# Patient Record
Sex: MALE | Race: WHITE | NOT HISPANIC OR LATINO | Employment: FULL TIME | URBAN - METROPOLITAN AREA
[De-identification: names, ages, dates, MRNs, and addresses within clinical notes are randomized per-mention and may not be internally consistent; named-entity substitution may affect disease eponyms.]

---

## 2017-12-13 ENCOUNTER — HOSPITAL ENCOUNTER (EMERGENCY)
Facility: HOSPITAL | Age: 52
Discharge: HOME | End: 2017-12-13
Attending: EMERGENCY MEDICINE
Payer: COMMERCIAL

## 2017-12-13 VITALS
WEIGHT: 155 LBS | HEIGHT: 66 IN | OXYGEN SATURATION: 98 % | HEART RATE: 108 BPM | DIASTOLIC BLOOD PRESSURE: 92 MMHG | BODY MASS INDEX: 24.91 KG/M2 | TEMPERATURE: 99 F | SYSTOLIC BLOOD PRESSURE: 178 MMHG | RESPIRATION RATE: 16 BRPM

## 2017-12-13 DIAGNOSIS — S61.209A AVULSION OF FINGER, INITIAL ENCOUNTER: Primary | ICD-10-CM

## 2017-12-13 PROCEDURE — 25000003 PHARM REV CODE 250

## 2017-12-13 PROCEDURE — 99283 EMERGENCY DEPT VISIT LOW MDM: CPT

## 2017-12-13 PROCEDURE — 99284 EMERGENCY DEPT VISIT MOD MDM: CPT | Mod: ,,, | Performed by: EMERGENCY MEDICINE

## 2017-12-13 RX ORDER — SULFAMETHOXAZOLE AND TRIMETHOPRIM 800; 160 MG/1; MG/1
1 TABLET ORAL 2 TIMES DAILY
Status: DISCONTINUED | OUTPATIENT
Start: 2017-12-13 | End: 2017-12-13

## 2017-12-13 RX ORDER — DOXYCYCLINE 100 MG/1
100 CAPSULE ORAL 2 TIMES DAILY
Qty: 20 CAPSULE | Refills: 0 | Status: SHIPPED | OUTPATIENT
Start: 2017-12-13 | End: 2017-12-13

## 2017-12-13 RX ORDER — SULFAMETHOXAZOLE AND TRIMETHOPRIM 800; 160 MG/1; MG/1
1 TABLET ORAL 2 TIMES DAILY
Status: DISCONTINUED | OUTPATIENT
Start: 2017-12-13 | End: 2017-12-13 | Stop reason: HOSPADM

## 2017-12-13 RX ORDER — SULFAMETHOXAZOLE AND TRIMETHOPRIM 800; 160 MG/1; MG/1
1 TABLET ORAL 2 TIMES DAILY
Qty: 14 TABLET | Refills: 0 | Status: SHIPPED | OUTPATIENT
Start: 2017-12-13 | End: 2017-12-13

## 2017-12-13 RX ORDER — DOXYCYCLINE 100 MG/1
100 CAPSULE ORAL 2 TIMES DAILY
Qty: 14 CAPSULE | Refills: 0 | Status: SHIPPED | OUTPATIENT
Start: 2017-12-13 | End: 2017-12-20

## 2017-12-13 RX ORDER — BACITRACIN ZINC 500 UNIT/G
1 OINTMENT (GRAM) TOPICAL 2 TIMES DAILY
Status: DISCONTINUED | OUTPATIENT
Start: 2017-12-13 | End: 2017-12-13 | Stop reason: SDUPTHER

## 2017-12-13 RX ORDER — BACITRACIN ZINC 500 UNIT/G
OINTMENT IN PACKET (EA) TOPICAL
Status: COMPLETED | OUTPATIENT
Start: 2017-12-13 | End: 2017-12-13

## 2017-12-13 RX ORDER — LIDOCAINE HYDROCHLORIDE 10 MG/ML
10 INJECTION INFILTRATION; PERINEURAL
Status: COMPLETED | OUTPATIENT
Start: 2017-12-13 | End: 2017-12-13

## 2017-12-13 RX ORDER — SULFAMETHOXAZOLE AND TRIMETHOPRIM 800; 160 MG/1; MG/1
1 TABLET ORAL 2 TIMES DAILY
Qty: 14 TABLET | Refills: 0 | Status: SHIPPED | OUTPATIENT
Start: 2017-12-13 | End: 2017-12-20

## 2017-12-13 RX ADMIN — BACITRACIN ZINC: 500 OINTMENT TOPICAL at 05:12

## 2017-12-13 RX ADMIN — LIDOCAINE HYDROCHLORIDE 10 ML: 10 INJECTION, SOLUTION INFILTRATION; PERINEURAL at 03:12

## 2017-12-13 RX ADMIN — SULFAMETHOXAZOLE AND TRIMETHOPRIM 1 TABLET: 800; 160 TABLET ORAL at 02:12

## 2017-12-13 NOTE — ED PROVIDER NOTES
Encounter Date: 12/13/2017       History     Chief Complaint   Patient presents with    Laceration     51yo male with no medical history presents to ED with avulsion to right middle finger. Patient caught finger in safety belt at work. Patient complains of throbbing pain. Seen at [facility] where tetanus was updated and advised to report to ED for further evaluation. Bleeding not resolved. Patient denies fever, chills, nausea, vomiting, chest pain, shortness of breath, weakness, syncope, headache.           Review of patient's allergies indicates:  No Known Allergies  History reviewed. No pertinent past medical history.  No past surgical history on file.  No family history on file.  Social History   Substance Use Topics    Smoking status: Not on file    Smokeless tobacco: Not on file    Alcohol use Not on file     Review of Systems   Constitutional: Negative for diaphoresis and fever.   HENT: Negative for nosebleeds.    Eyes: Negative for visual disturbance.   Respiratory: Negative for cough and shortness of breath.    Cardiovascular: Negative for chest pain and leg swelling.   Gastrointestinal: Negative for abdominal distention, nausea and vomiting.   Genitourinary: Negative for dysuria and hematuria.   Musculoskeletal: Negative for neck pain.   Skin: Positive for wound. Negative for rash.   Neurological: Negative for syncope, weakness, light-headedness and headaches.   Psychiatric/Behavioral: The patient is not nervous/anxious.        Physical Exam     Initial Vitals [12/13/17 1139]   BP Pulse Resp Temp SpO2   (!) 178/92 108 16 98.9 °F (37.2 °C) 98 %      MAP       120.67         Physical Exam    Vitals reviewed.  Constitutional: He appears well-developed and well-nourished. He is not diaphoretic. No distress.   HENT:   Head: Normocephalic and atraumatic.   Nose: Nose normal.   Mouth/Throat: Oropharynx is clear and moist. No oropharyngeal exudate.   Eyes: Conjunctivae and EOM are normal. Pupils are equal,  round, and reactive to light.   Neck: Normal range of motion. Neck supple. No thyromegaly present.   Cardiovascular: Normal rate, regular rhythm and intact distal pulses.   Pulses:       Radial pulses are 2+ on the right side, and 2+ on the left side.   Pulmonary/Chest: Breath sounds normal. No respiratory distress. He has no wheezes. He exhibits no tenderness.   Abdominal: Soft. Bowel sounds are normal. He exhibits no distension. There is no tenderness. There is no rebound.   Musculoskeletal: Normal range of motion.   Lymphadenopathy:     He has no cervical adenopathy.   Neurological: He is alert and oriented to person, place, and time. He has normal strength. No cranial nerve deficit or sensory deficit.   Patient neurovascularly intact. All three levels of tendons intact.   Skin: Skin is warm and dry. Capillary refill takes less than 2 seconds. No rash noted.   Avulsion noted to right middle finger. Nail bed intact, strength and sensation intact. , no tendon involvement noted.    Psychiatric: He has a normal mood and affect.         ED Course   Procedures  Labs Reviewed - No data to display     Imaging Results          X-Ray Finger 2 or More Views Right (Final result)  Result time 12/13/17 15:13:09    Final result by Carloz Otoole MD (12/13/17 15:13:09)                 Impression:      1.  No convincing acute displaced fracture or dislocation of the third digit noting diffuse edema and distal soft tissue injury, nail bed injury not excluded.      Electronically signed by: CARLOZ OTOOLE MD  Date:     12/13/17  Time:    15:13              Narrative:    Finger 2 or more views right    Clinical history: Right middle finger avulsion    Comparison: None    Findings:  3 views.    Extensive bandaging overlies the third digit, limiting evaluation of the soft tissues and osseous structures.  Allowing for this, no convincing acute displaced fracture or dislocation of the third digit.  No convincing radiopaque foreign  body.  There is a soft tissue defect involving the distal aspect of the digit, in this patient with provided history of avulsion.  Nail bed injury is not excluded.  There is diffuse edema about the digit.                                 Medical Decision Making:   History:   Old Medical Records: I decided to obtain old medical records.  Old Records Summarized: records from clinic visits.  Clinical Tests:   Radiological Study: Ordered and Reviewed       APC / Resident Notes:   53yo male with no medical history presents to ED with avulsion to right middle finger.     DDX includes but is not limited to avulsion, fracture, laceration, dislocation. Will get xray and ask ortho for recs. Will give oral bactrim DS in ED. Xray benign, no signs of fracture or dislocation, nailbed is intact. Bleeding controlled, neurovascularly intact on reassessment. Ortho recommends digital block, extensive irrigation, mupirocin cream, sterile dressing, aluminum finger splint and follow up with hand clinic in 1 week. Discharged to home in stable condition, return to ED warnings given, follow up and patient care instructions given.  Will discharge home with doxycycline 100mg and bactrim DS.     I have discussed and reviewed with my supervising physician.         Attending Attestation:     Physician Attestation Statement for NP/PA:   I have conducted a face to face encounter with this patient in addition to the NP/PA, due to NP/PA Request    Other NP/PA Attestation Additions:      Medical Decision Making: Patient presented here with avulsion injury.  Movement was washed out well patient is neurovascularly intact.  We spoke with orthopedics nothing acute that they would do here.  They'll follow-up closely as an outpatient.  Patient otherwise looks well as a result of be discharged home on multiple antibiotics as he states he is going on a cruise ship in may be going into the water.  Instructed him not to swim or get the wound wet until it  completely heals up.  Patient was instructed to follow up closely with orthopedics in the next 1 week without fail he states he will be able to follow-up on Tuesday                 ED Course      Clinical Impression:   The encounter diagnosis was Avulsion of finger, initial encounter.    Disposition:   Disposition: Discharged  Condition: Stable                        Karin Gusman PA-C  12/13/17 1634       Hugo Sanford MD  12/13/17 1554

## 2017-12-13 NOTE — ED NOTES
Patient identifiers verified and correct for Gordon Black.    LOC: The patient is awake, alert and aware of environment with an appropriate affect, the patient is oriented x 3 and speaking appropriately.  APPEARANCE: Patient resting comfortably and in no acute distress, patient is clean and well groomed, patient's clothing is properly fastened.  SKIN: The skin is warm and dry, color consistent with ethnicity, patient has normal skin turgor and moist mucus membranes, avulsion to right middle fingertip, 2+ radial pulses noted  MUSCULOSKELETAL: Patient moving all extremities spontaneously, no obvious swelling  RESPIRATORY: Airway is open and patent, respirations are spontaneous, patient has a normal effort and rate, no accessory muscle use noted  CARDIAC: Patient has a normal rate and regular rhythm, no periphreal kaylin ma noted, capillary refill < 3 seconds.  ABDOMEN: Soft and non tender to palpation, no distention noted  NEUROLOGIC: PERRL, 3mm bilaterally, eyes open spontaneously, behavior appropriate to situation, follows commands, facial expression symmetrical, bilateral hand grasp equal and even, purposeful motor response noted, normal sensation in all extremities when touched with a finger.

## 2017-12-13 NOTE — ED NOTES
Patient reports at 1015am  right middle finger got caught in belt at work.  Patient went to [facility] who determined he didn't have bone involvement but couldn't be sutured.  Patient was given tetanus shot today.  Patient reports throbbing to area.  Denies use of blood thinner.

## 2019-09-05 ENCOUNTER — OCCUPATIONAL HEALTH (OUTPATIENT)
Dept: URGENT CARE | Facility: CLINIC | Age: 54
End: 2019-09-05

## 2019-09-05 DIAGNOSIS — Z02.1 PRE-EMPLOYMENT EXAMINATION: Primary | ICD-10-CM

## 2019-09-05 PROCEDURE — 80305 PR NON-DOT DRUG SCREENS: ICD-10-PCS | Mod: S$GLB,,, | Performed by: PHYSICIAN ASSISTANT

## 2019-09-05 PROCEDURE — 94010 BREATHING CAPACITY TEST: CPT | Mod: S$GLB,,, | Performed by: PHYSICIAN ASSISTANT

## 2019-09-05 PROCEDURE — 97750 PHYSICAL PERFORMANCE TEST: CPT | Mod: S$GLB,,, | Performed by: PHYSICIAN ASSISTANT

## 2019-09-05 PROCEDURE — 99080 OSHA QUESTIONNAIRE: ICD-10-PCS | Mod: S$GLB,,, | Performed by: PHYSICIAN ASSISTANT

## 2019-09-05 PROCEDURE — 99080 SPECIAL REPORTS OR FORMS: CPT | Mod: S$GLB,,, | Performed by: PHYSICIAN ASSISTANT

## 2019-09-05 PROCEDURE — 97750 LIFT TEST: ICD-10-PCS | Mod: S$GLB,,, | Performed by: PHYSICIAN ASSISTANT

## 2019-09-05 PROCEDURE — 80305 DRUG TEST PRSMV DIR OPT OBS: CPT | Mod: S$GLB,,, | Performed by: PHYSICIAN ASSISTANT

## 2019-09-05 PROCEDURE — 94010 PULMONARY FUNCTION SCREENING (OCC MED PHYSICALS): ICD-10-PCS | Mod: S$GLB,,, | Performed by: PHYSICIAN ASSISTANT

## 2019-09-05 PROCEDURE — 99499 PHYSICAL, BASIC COMPLEXITY: ICD-10-PCS | Mod: S$GLB,,, | Performed by: PHYSICIAN ASSISTANT

## 2019-09-05 PROCEDURE — 99499 UNLISTED E&M SERVICE: CPT | Mod: S$GLB,,, | Performed by: PHYSICIAN ASSISTANT
